# Patient Record
Sex: MALE
[De-identification: names, ages, dates, MRNs, and addresses within clinical notes are randomized per-mention and may not be internally consistent; named-entity substitution may affect disease eponyms.]

---

## 2024-04-12 ENCOUNTER — CARE COORDINATION (OUTPATIENT)
Dept: OTHER | Facility: CLINIC | Age: 74
End: 2024-04-12

## 2024-04-12 NOTE — CARE COORDINATION
Care Transitions Outreach Attempt    Call within 2 business days of discharge: Yes     ACM contacted patient for introduction to Care Transitions related to IP stay. Verified name and  with patient as identifiers.     Able to reach pt. Discussed ACM role with [facility]. Pt reports using [facility] for Urgent care needs only. Attempted to discuss IP stay and rehab but pt not forthcoming on details. Pt reports PCP follow up on Monday but refused to provided PCP name. Pt resistant to provide information. Attempted to clarify role but pt would not discuss further. Care Transitions declined. Will sign off.     ACM provided contact information for self referral if patient would need care management in the future. ACM will sign off at this time.     Patient: Rock Aldana III Patient : 1950 MRN: [id number]    Last Discharge Facility       None              Was this an external facility discharge? Yes, 24  Discharge Facility Name: Inguinal hernia with obstruction     Noted following upcoming appointments from discharge chart review:   [facility] follow up appointment(s): No future appointments.  Non-[facility]  follow up appointment(s): n/a

## 2024-05-21 ENCOUNTER — CARE COORDINATION (OUTPATIENT)
Dept: OTHER | Facility: CLINIC | Age: 74
End: 2024-05-21

## 2024-05-21 NOTE — CARE COORDINATION
Care Transitions Note    Initial Call - Call within 2 business days of discharge: Yes     Attempted to reach patient for transitions of care follow up. Unable to reach patient.    Outreach Attempts:   HIPAA compliant voicemail left for patient.     Patient: Rock Aldana III    Patient : 1950   MRN: [id number]    Reason for Admission: peripheral vascular disease, difficulty walking, wound to L lower leg   Discharge Date:    RURS: Readmission Risk              Risk of Unplanned Readmission:  0            Was this an external facility discharge? Yes. Discharge Date: 24. Facility Name: [address]        Plan for follow-up on next business day.      Kiya Blackburn RN

## 2024-05-22 ENCOUNTER — CARE COORDINATION (OUTPATIENT)
Dept: OTHER | Facility: CLINIC | Age: 74
End: 2024-05-22

## 2024-05-22 NOTE — CARE COORDINATION
Care Transitions Note    Initial Call - Call within 2 business days of discharge: Yes     Patient Current Location:  [place]    Care Transition Nurse contacted the patient by telephone to perform post hospital discharge assessment, verified name and  as identifiers. Provided introduction to self, and explanation of the Care Transition Nurse role.     Patient: Rock Aldana III    Patient : 1950   MRN: [id number]    Reason for Admission: Wound to L lower leg, peripheral vascular disease, difficulty walking  Discharge Date:    RURS: No data recorded    Was this an external facility discharge? Yes. Discharge Date: 24. Facility Name: [facility]       Care Summary Note: CTN able to reach patient at this time. Patient states he is glad to not be admitted in the hospital anymore and states he is due to have a visit from  today and had his first visit yesterday. Patient not forthcoming with more information on admission. CTN asked what HH was doing during their visits, such as vitals and wound care, patient states \"yeah yeah they did all of that\". CTN then asked if patient had made follow up appointment with PCP and he stated that he already saw PCP yesterday, 24. Patient not willing to give PCP name. When asked how that went and if PCP gave any changes in care or medications, patient responded with \"you know what he did, its all a big game\". CTN questioned patient by what he meant by that and he responded with \"you should know what I mean by that\". CTN again educated patient on her role and offered any and all care management services at this time such as assistance with prescriptions, personal aid care, appointments and patient declined at this time. Patient declined any further follow up calls from CTN. CTN will sign off.       Follow Up Appointment:   Discussed follow up appointments. Patient has hospital follow up appointment scheduled within 7 days of discharge. Saw PCP yesterday